# Patient Record
Sex: FEMALE | Race: WHITE | URBAN - METROPOLITAN AREA
[De-identification: names, ages, dates, MRNs, and addresses within clinical notes are randomized per-mention and may not be internally consistent; named-entity substitution may affect disease eponyms.]

---

## 2018-11-20 ENCOUNTER — APPOINTMENT (RX ONLY)
Dept: URBAN - METROPOLITAN AREA CLINIC 39 | Facility: CLINIC | Age: 48
Setting detail: DERMATOLOGY
End: 2018-11-20

## 2018-11-20 DIAGNOSIS — L82.1 OTHER SEBORRHEIC KERATOSIS: ICD-10-CM

## 2018-11-20 PROBLEM — D48.5 NEOPLASM OF UNCERTAIN BEHAVIOR OF SKIN: Status: ACTIVE | Noted: 2018-11-20

## 2018-11-20 PROBLEM — L85.3 XEROSIS CUTIS: Status: ACTIVE | Noted: 2018-11-20

## 2018-11-20 PROCEDURE — ? COUNSELING

## 2018-11-20 PROCEDURE — ? BIOPSY BY SHAVE METHOD

## 2018-11-20 PROCEDURE — 99202 OFFICE O/P NEW SF 15 MIN: CPT | Mod: 25

## 2018-11-20 PROCEDURE — 11100: CPT

## 2018-11-20 ASSESSMENT — LOCATION DETAILED DESCRIPTION DERM
LOCATION DETAILED: RIGHT SUPERIOR MEDIAL UPPER BACK
LOCATION DETAILED: LEFT ANTERIOR PROXIMAL THIGH

## 2018-11-20 ASSESSMENT — LOCATION SIMPLE DESCRIPTION DERM
LOCATION SIMPLE: RIGHT UPPER BACK
LOCATION SIMPLE: LEFT THIGH

## 2018-11-20 ASSESSMENT — LOCATION ZONE DERM
LOCATION ZONE: TRUNK
LOCATION ZONE: LEG

## 2018-11-20 ASSESSMENT — PAIN INTENSITY VAS: HOW INTENSE IS YOUR PAIN 0 BEING NO PAIN, 10 BEING THE MOST SEVERE PAIN POSSIBLE?: NO PAIN

## 2018-11-20 NOTE — HPI: SKIN LESION
Is This A New Presentation, Or A Follow-Up?: Skin Lesion
What Type Of Note Output Would You Prefer (Optional)?: Standard Output
Has Your Skin Lesion Been Treated?: not been treated
Which Family Member (Optional)?: father

## 2018-11-20 NOTE — PROCEDURE: BIOPSY BY SHAVE METHOD
Destruction After The Procedure: No
Biopsy Type: H and E
Hemostasis: Drysol
Lab Facility: [id number]
Wound Care: Vaseline
Lab: [id number]
Depth Of Biopsy: dermis
Biopsy Method: Personna blade
Was A Bandage Applied: Yes
Notification Instructions: Patient will be notified of biopsy results. However, patient instructed to call the office if not contacted within 2 weeks.
Detail Level: Detailed
Dressing: Band-Aid
Size Of Lesion In Cm: 0.4
Billing Type: Third-Party Bill
Curettage Text: Curettage after biopsy
Anesthesia Volume In Cc (Will Not Render If 0): 1
Consent: Patient was informed of risks including but not limited to scarring, infection, bleeding, scabbing, incomplete removal with need for additional therapy/surgery, and allergy to anesthesia.
Anesthesia Type: 1% lidocaine with 1:100,000 epinephrine and a 1:10 solution of 8.4% sodium bicarbonate
Type Of Destruction Used: Curettage
Post-Care Instructions: I reviewed with the patient in detail post-care instructions. Patient is to keep the biopsy site dry overnight, cleanse with soap and water, and then apply vaseline for at least 3 weeks
Additional Anesthesia Volume In Cc (Will Not Render If 0): 0